# Patient Record
Sex: MALE | Race: BLACK OR AFRICAN AMERICAN | NOT HISPANIC OR LATINO
[De-identification: names, ages, dates, MRNs, and addresses within clinical notes are randomized per-mention and may not be internally consistent; named-entity substitution may affect disease eponyms.]

---

## 2018-05-24 ENCOUNTER — APPOINTMENT (OUTPATIENT)
Dept: PHYSICAL MEDICINE AND REHAB | Facility: CLINIC | Age: 83
End: 2018-05-24
Payer: MEDICARE

## 2018-05-24 VITALS
HEART RATE: 58 BPM | TEMPERATURE: 98.2 F | OXYGEN SATURATION: 99 % | DIASTOLIC BLOOD PRESSURE: 71 MMHG | SYSTOLIC BLOOD PRESSURE: 123 MMHG

## 2018-05-24 DIAGNOSIS — R26.9 UNSPECIFIED ABNORMALITIES OF GAIT AND MOBILITY: ICD-10-CM

## 2018-05-24 PROCEDURE — 99213 OFFICE O/P EST LOW 20 MIN: CPT

## 2018-05-24 RX ORDER — SELENIUM SULFIDE 25 MG/ML
2.5 LOTION TOPICAL
Qty: 120 | Refills: 0 | Status: ACTIVE | COMMUNITY
Start: 2018-04-16

## 2018-05-24 RX ORDER — BLOOD SUGAR DIAGNOSTIC
STRIP MISCELLANEOUS
Qty: 200 | Refills: 0 | Status: ACTIVE | COMMUNITY
Start: 2018-01-12

## 2018-05-24 RX ORDER — LANCETS
EACH MISCELLANEOUS
Qty: 100 | Refills: 0 | Status: ACTIVE | COMMUNITY
Start: 2018-04-06

## 2018-05-24 RX ORDER — PEN NEEDLE, DIABETIC 29 G X1/2"
31G X 5 MM NEEDLE, DISPOSABLE MISCELLANEOUS
Qty: 200 | Refills: 0 | Status: ACTIVE | COMMUNITY
Start: 2017-10-27

## 2018-05-24 RX ORDER — CLOBETASOL PROPIONATE 0.5 MG/G
0.05 GEL TOPICAL
Qty: 60 | Refills: 0 | Status: ACTIVE | COMMUNITY
Start: 2017-06-12

## 2018-05-24 RX ORDER — INSULIN ASPART 100 [IU]/ML
100 INJECTION, SOLUTION INTRAVENOUS; SUBCUTANEOUS
Qty: 15 | Refills: 0 | Status: ACTIVE | COMMUNITY
Start: 2017-10-27

## 2018-05-24 RX ORDER — INSULIN DETEMIR 100 [IU]/ML
100 INJECTION, SOLUTION SUBCUTANEOUS
Qty: 15 | Refills: 0 | Status: ACTIVE | COMMUNITY
Start: 2017-10-27

## 2018-06-24 ENCOUNTER — APPOINTMENT (OUTPATIENT)
Dept: MRI IMAGING | Facility: IMAGING CENTER | Age: 83
End: 2018-06-24

## 2018-08-24 ENCOUNTER — APPOINTMENT (OUTPATIENT)
Dept: PHYSICAL MEDICINE AND REHAB | Facility: CLINIC | Age: 83
End: 2018-08-24
Payer: MEDICARE

## 2018-08-24 VITALS — SYSTOLIC BLOOD PRESSURE: 138 MMHG | OXYGEN SATURATION: 99 % | HEART RATE: 63 BPM | DIASTOLIC BLOOD PRESSURE: 68 MMHG

## 2018-08-24 PROCEDURE — 99213 OFFICE O/P EST LOW 20 MIN: CPT

## 2018-09-05 ENCOUNTER — APPOINTMENT (OUTPATIENT)
Dept: PHYSICAL MEDICINE AND REHAB | Facility: CLINIC | Age: 83
End: 2018-09-05
Payer: MEDICARE

## 2018-09-05 VITALS — OXYGEN SATURATION: 96 % | DIASTOLIC BLOOD PRESSURE: 78 MMHG | SYSTOLIC BLOOD PRESSURE: 157 MMHG | HEART RATE: 83 BPM

## 2018-09-05 PROCEDURE — 99214 OFFICE O/P EST MOD 30 MIN: CPT

## 2018-10-02 ENCOUNTER — APPOINTMENT (OUTPATIENT)
Dept: PHYSICAL MEDICINE AND REHAB | Facility: CLINIC | Age: 83
End: 2018-10-02
Payer: MEDICARE

## 2018-10-02 ENCOUNTER — OUTPATIENT (OUTPATIENT)
Dept: OUTPATIENT SERVICES | Facility: HOSPITAL | Age: 83
LOS: 1 days | End: 2018-10-02
Payer: COMMERCIAL

## 2018-10-02 DIAGNOSIS — M54.16 RADICULOPATHY, LUMBAR REGION: ICD-10-CM

## 2018-10-02 PROCEDURE — 64483 NJX AA&/STRD TFRM EPI L/S 1: CPT | Mod: RT

## 2018-10-02 PROCEDURE — 64484 NJX AA&/STRD TFRM EPI L/S EA: CPT

## 2018-10-02 PROCEDURE — 64484 NJX AA&/STRD TFRM EPI L/S EA: CPT | Mod: RT

## 2018-10-02 PROCEDURE — 64483 NJX AA&/STRD TFRM EPI L/S 1: CPT

## 2018-10-02 PROCEDURE — 82962 GLUCOSE BLOOD TEST: CPT

## 2018-10-08 DIAGNOSIS — E11.65 TYPE 2 DIABETES MELLITUS WITH HYPERGLYCEMIA: ICD-10-CM

## 2018-10-08 DIAGNOSIS — M54.17 RADICULOPATHY, LUMBOSACRAL REGION: ICD-10-CM

## 2018-10-30 ENCOUNTER — APPOINTMENT (OUTPATIENT)
Dept: PHYSICAL MEDICINE AND REHAB | Facility: CLINIC | Age: 83
End: 2018-10-30
Payer: MEDICARE

## 2018-10-30 VITALS
OXYGEN SATURATION: 100 % | SYSTOLIC BLOOD PRESSURE: 129 MMHG | TEMPERATURE: 97.5 F | DIASTOLIC BLOOD PRESSURE: 69 MMHG | HEART RATE: 63 BPM

## 2018-10-30 PROCEDURE — 99214 OFFICE O/P EST MOD 30 MIN: CPT | Mod: GC

## 2018-11-06 ENCOUNTER — APPOINTMENT (OUTPATIENT)
Dept: PHYSICAL MEDICINE AND REHAB | Facility: CLINIC | Age: 83
End: 2018-11-06
Payer: MEDICARE

## 2018-11-06 ENCOUNTER — OUTPATIENT (OUTPATIENT)
Dept: OUTPATIENT SERVICES | Facility: HOSPITAL | Age: 83
LOS: 1 days | End: 2018-11-06
Payer: COMMERCIAL

## 2018-11-06 DIAGNOSIS — M54.16 RADICULOPATHY, LUMBAR REGION: ICD-10-CM

## 2018-11-06 PROCEDURE — 82962 GLUCOSE BLOOD TEST: CPT

## 2018-11-06 PROCEDURE — 64483 NJX AA&/STRD TFRM EPI L/S 1: CPT

## 2018-11-06 PROCEDURE — 64483 NJX AA&/STRD TFRM EPI L/S 1: CPT | Mod: RT

## 2018-11-06 PROCEDURE — 64484 NJX AA&/STRD TFRM EPI L/S EA: CPT | Mod: RT

## 2018-11-06 PROCEDURE — 64484 NJX AA&/STRD TFRM EPI L/S EA: CPT

## 2018-11-08 DIAGNOSIS — M54.17 RADICULOPATHY, LUMBOSACRAL REGION: ICD-10-CM

## 2018-11-08 DIAGNOSIS — E11.65 TYPE 2 DIABETES MELLITUS WITH HYPERGLYCEMIA: ICD-10-CM

## 2018-11-28 ENCOUNTER — APPOINTMENT (OUTPATIENT)
Dept: PHYSICAL MEDICINE AND REHAB | Facility: CLINIC | Age: 83
End: 2018-11-28
Payer: MEDICARE

## 2018-11-28 VITALS
TEMPERATURE: 97.6 F | HEART RATE: 62 BPM | OXYGEN SATURATION: 97 % | SYSTOLIC BLOOD PRESSURE: 135 MMHG | DIASTOLIC BLOOD PRESSURE: 75 MMHG

## 2018-11-28 DIAGNOSIS — M54.41 LUMBAGO WITH SCIATICA, RIGHT SIDE: ICD-10-CM

## 2018-11-28 PROCEDURE — 99214 OFFICE O/P EST MOD 30 MIN: CPT

## 2019-05-22 ENCOUNTER — APPOINTMENT (OUTPATIENT)
Dept: PHYSICAL MEDICINE AND REHAB | Facility: CLINIC | Age: 84
End: 2019-05-22
Payer: MEDICARE

## 2019-05-22 VITALS
OXYGEN SATURATION: 99 % | SYSTOLIC BLOOD PRESSURE: 134 MMHG | TEMPERATURE: 97.8 F | DIASTOLIC BLOOD PRESSURE: 70 MMHG | HEART RATE: 69 BPM

## 2019-05-22 DIAGNOSIS — Z80.9 FAMILY HISTORY OF MALIGNANT NEOPLASM, UNSPECIFIED: ICD-10-CM

## 2019-05-22 PROCEDURE — 99214 OFFICE O/P EST MOD 30 MIN: CPT

## 2019-05-24 PROBLEM — Z80.9 FAMILY HISTORY OF CANCER: Status: ACTIVE | Noted: 2019-05-24

## 2019-05-24 NOTE — ASSESSMENT
[FreeTextEntry1] : Mr. HERNÁNDEZ is a 83 year year old man here for follow up of right distal residual limb pain x 1 month.  Pain has been bothersome making it difficult to walk, stand, and sleep.  Topical analgesic (aspercreme) and massage helps. Possible neuroma, unlikely related to skin breakdown. Recommend him to off load the painful site, can trial gabapentin 600mg at bedtime, follow up with PCP to check Cr. Clearance. Continue topical NSAIDs. Right knee MRI to evaluate for neuroma. Follow up after MRI.

## 2019-05-24 NOTE — REVIEW OF SYSTEMS
[Muscle Pain] : muscle pain [Negative] : Heme/Lymph [Muscle Weakness] : no muscle weakness [FreeTextEntry9] : as per hpi

## 2019-05-24 NOTE — PHYSICAL EXAM
[FreeTextEntry1] : General: NAD, alert\par Psych: normal mood and affect\par HEENT: NC/AT, normal visual tracking\par Pulmonary: no resp distress, chest expansion appears symmetrical\par CV: extremities are warm and perfused\par Abd: non-distended\par Ext: bilateral BKA with prosthesis - skin intact with boggy tissue to distal right residual tender with deep palpation\par \par

## 2019-05-24 NOTE — HISTORY OF PRESENT ILLNESS
[FreeTextEntry1] : Mr. ALEXANDRA HERNÁNDEZ is a 83 year old male here for follow up of right residual limb pain. He denies phantom pain.  Right residual limb pain has been present for about 1 month at a 9/10 - described as throbbing, grabbing, numbing, and can be sharp.  Pain isn't prominent while in a seated position without right lower extremity prosthetic on.  Pain with standing and walking in right prosthetic and during sleeping without prosthesis on.  He states he would wake up screaming from pain at timest.  Better with topical analgesic and massage.  States he take gabapentin 300mg at bedtime only without any help with pain.\par

## 2019-06-19 ENCOUNTER — APPOINTMENT (OUTPATIENT)
Dept: PHYSICAL MEDICINE AND REHAB | Facility: CLINIC | Age: 84
End: 2019-06-19
Payer: MEDICARE

## 2019-06-19 VITALS
HEART RATE: 65 BPM | TEMPERATURE: 98 F | DIASTOLIC BLOOD PRESSURE: 78 MMHG | OXYGEN SATURATION: 97 % | SYSTOLIC BLOOD PRESSURE: 153 MMHG

## 2019-06-19 PROCEDURE — 99214 OFFICE O/P EST MOD 30 MIN: CPT | Mod: 25

## 2019-06-19 PROCEDURE — 64450 NJX AA&/STRD OTHER PN/BRANCH: CPT | Mod: RT

## 2019-06-21 NOTE — REVIEW OF SYSTEMS
[Muscle Pain] : muscle pain [Negative] : Heme/Lymph [FreeTextEntry9] : as per hpi [Muscle Weakness] : no muscle weakness

## 2019-06-21 NOTE — ASSESSMENT
[FreeTextEntry1] : Mr. HERNÁNDEZ is a 83 year year old man here for follow up of right distal residual limb pain may be secondary to neuroma, unlikely related to skin breakdown/changes. Have increased gabapentin, toppical analgesic agents, and off loading right limb - all without improvement.  MRI reviewed with patient and wife.  Interventional block performed in office under ultrasound guidance (see procedure note).  He tolerated procedure well with improvement of pain. If block duration is limited, consider RFA. Follow up in 1 month.

## 2019-06-21 NOTE — HISTORY OF PRESENT ILLNESS
[FreeTextEntry1] : Mr. ALEXANDRA HERNÁNDEZ is a 83 year old male here for follow up of right residual limb pain. Pain is intermittent, like a "nagging toothache." Reports sleep disturbance from pain.  Pain is spontaneous and primarily to distal right limb.  Takes gabapentin 300mg in AM and 600mg HS with no effect.  Uses topical pain blocker and voltaren gel which only provides 20 minutes of pain relief. Pain has been present for about 3 month at a 9/10 - described as throbbing, grabbing, numbing, and can be sharp. Pain isn't prominent while in a seated position without right lower extremity prosthetic on. Pain with standing and walking in right prosthetic and during sleeping without prosthesis on. He states he would wake up screaming from pain at timest. \par \par

## 2019-06-21 NOTE — PROCEDURE
[de-identified] : Reason for procedure: neuroma pain\par \par Procedure: \par 1. sciatic nerve block at the popliteal fossa\par 2. ultrasound guidance\par \par Physician: Dr. Stoner\par Medication injected: 40mg Kenalog, 2cc Lidocaine 1%\par Sedation medications: None\par Estimated blood loss: None\par Complications: None\par \par Technique: The procedure was explained in detail including associated risk and informed consent was obtained. The patient was placed in prone position. Ultrasound evaluation demonstrated the a mixed hyper/hypo echoic structure with "honeycomb" appearance consistent with nerve, overlying muscles and vasculature. There was reproduction of pain on palpation. The area was prepped in normal sterile fashion with Chloroprep. A 25 gauge 1.5 inch needle was advanced toward and just superficial the hyper/hypoechoic structure with us guidance. After negative aspiration of heme, the above medications were injected with ultrasound guidance. The needle was then directed deep to the structure. Hydrodissection was performed with the above medications. Needle was then removed, bandaid placed over injection site. There was no complications, the patient was provided with post injection instructions and noted improvement in pain after injection.

## 2019-06-21 NOTE — PHYSICAL EXAM
[FreeTextEntry1] : General: NAD, alert\par Psych: normal mood and affect\par HEENT: NC/AT, normal visual tracking\par Pulmonary: no resp distress, chest expansion appears symmetrical\par CV: extremities are warm and perfused\par Abd: non-distended\par Ext: bilateral BKA with prosthesis - skin intact with hyperpigmentation to anterior aspect below knee - nonpainful to touch.  Boggy tissue to distal right residual limb tender with deep palpation.  Most tender to mid popliteal fossa\par \par

## 2019-06-21 NOTE — DATA REVIEWED
[MRI] : MRI [FreeTextEntry1] : Right knee MRI\par meniscal tear\par insertional quad tendinosis, patellar tendon degeneration, anterior subcutaneous edema, patellar articular cartilage loss\par  gastrocnemius muscle atrophy and hyperintensity

## 2019-07-17 ENCOUNTER — APPOINTMENT (OUTPATIENT)
Dept: PHYSICAL MEDICINE AND REHAB | Facility: CLINIC | Age: 84
End: 2019-07-17
Payer: MEDICARE

## 2019-07-17 VITALS
OXYGEN SATURATION: 99 % | HEART RATE: 61 BPM | TEMPERATURE: 97.6 F | DIASTOLIC BLOOD PRESSURE: 58 MMHG | SYSTOLIC BLOOD PRESSURE: 132 MMHG

## 2019-07-17 PROCEDURE — 99214 OFFICE O/P EST MOD 30 MIN: CPT | Mod: 25

## 2019-07-17 PROCEDURE — 76942 ECHO GUIDE FOR BIOPSY: CPT | Mod: RT

## 2019-07-17 PROCEDURE — 64445 NJX AA&/STRD SCIATIC NRV IMG: CPT

## 2019-07-17 NOTE — ASSESSMENT
[FreeTextEntry1] : Mr. HERNÁNDEZ is a 84 year year old man here for follow up of right distal residual limb pain may be secondary to neuroma. He has tried increased gabapentin, toppical analgesic agents, and off loading right limb - all without improvement.  MRI reviewed with patient and wife.  Interventional block performed in office under ultrasound guidance (see procedure note) repeated today.  He tolerated procedure well with improvement of pain. If block duration is limited, consider RFA. He will contact me in 2 weeks to discuss further.

## 2019-07-17 NOTE — HISTORY OF PRESENT ILLNESS
[FreeTextEntry1] : Mr. ALEXANDRA HERNÁNDEZ is a 83 year old male here for follow up of right residual limb pain. He had sciatic block with improvement of his pain for about 2 weeks. Pain has returned. No complications from recent block. Pain is intermittent, like a "nagging toothache." Reports sleep disturbance from pain.  Pain is spontaneous and primarily to distal right limb.  Takes gabapentin 300mg in AM and 600mg HS with no effect.  Pain has been present for about 4 month at a 9/10 - described as throbbing, grabbing, numbing, and can be sharp. Pain isn't prominent while in a seated position without right lower extremity prosthetic on. Pain with standing and walking in right prosthetic and during sleeping without prosthesis on. He states he would wake up screaming from pain at timest. \par \par

## 2019-07-17 NOTE — REVIEW OF SYSTEMS
[Muscle Pain] : muscle pain [Muscle Weakness] : no muscle weakness [Negative] : Heme/Lymph [FreeTextEntry9] : as per hpi

## 2019-07-17 NOTE — PROCEDURE
[de-identified] : Reason for procedure: neuroma pain\par \par Procedure: \par 1. sciatic nerve block at the popliteal fossa\par 2. ultrasound guidance\par \par Physician: Dr. Stoner\par Medication injected: 40mg Kenalog, 2cc Lidocaine 1%\par Sedation medications: None\par Estimated blood loss: None\par Complications: None\par \par Technique: The procedure was explained in detail including associated risk and informed consent was obtained. The patient was placed in prone position. Ultrasound evaluation demonstrated the a mixed hyper/hypo echoic structure with "honeycomb" appearance consistent with nerve, overlying muscles and vasculature. There was reproduction of pain on palpation. The area was prepped in normal sterile fashion with Chloroprep. A 25 gauge 1.5 inch needle was advanced toward and just superficial the hyper/hypoechoic structure with us guidance. After negative aspiration of heme, the above medications were injected with ultrasound guidance. The needle was then directed deep to the structure. Hydrodissection was performed with the above medications. Needle was then removed, bandaid placed over injection site. There was no complications, the patient was provided with post injection instructions and noted improvement in pain after injection.

## 2019-07-17 NOTE — PHYSICAL EXAM
[FreeTextEntry1] : General: NAD, alert\par Psych: normal mood and affect\par HEENT: NC/AT, normal visual tracking\par Pulmonary: no resp distress, chest expansion appears symmetrical\par CV: extremities are warm and perfused\par Abd: non-distended\par Ext: bilateral BKA with prosthesis - skin intact with hyperpigmentation to anterior aspect below knee - nonpainful to touch.  Boggy tissue to distal right residual limb tender with deep palpation.  Most tender to mid popliteal fossa

## 2019-08-15 ENCOUNTER — RX RENEWAL (OUTPATIENT)
Age: 84
End: 2019-08-15

## 2019-08-19 ENCOUNTER — RX RENEWAL (OUTPATIENT)
Age: 84
End: 2019-08-19

## 2019-08-19 RX ORDER — DICLOFENAC SODIUM 10 MG/G
1 GEL TOPICAL
Qty: 100 | Refills: 2 | Status: ACTIVE | COMMUNITY
Start: 2019-05-22 | End: 1900-01-01

## 2019-08-20 ENCOUNTER — OUTPATIENT (OUTPATIENT)
Dept: OUTPATIENT SERVICES | Facility: HOSPITAL | Age: 84
LOS: 1 days | End: 2019-08-20
Payer: MEDICARE

## 2019-08-20 ENCOUNTER — APPOINTMENT (OUTPATIENT)
Dept: PHYSICAL MEDICINE AND REHAB | Facility: CLINIC | Age: 84
End: 2019-08-20

## 2019-08-20 DIAGNOSIS — E11.65 TYPE 2 DIABETES MELLITUS WITH HYPERGLYCEMIA: ICD-10-CM

## 2019-08-20 DIAGNOSIS — T87.30: ICD-10-CM

## 2019-08-20 DIAGNOSIS — M54.12 RADICULOPATHY, CERVICAL REGION: ICD-10-CM

## 2019-08-20 PROCEDURE — 64640 INJECTION TREATMENT OF NERVE: CPT

## 2019-08-20 PROCEDURE — 82962 GLUCOSE BLOOD TEST: CPT

## 2019-09-03 ENCOUNTER — APPOINTMENT (OUTPATIENT)
Dept: PHYSICAL MEDICINE AND REHAB | Facility: CLINIC | Age: 84
End: 2019-09-03
Payer: MEDICARE

## 2019-09-03 VITALS
HEART RATE: 68 BPM | TEMPERATURE: 97.5 F | OXYGEN SATURATION: 100 % | SYSTOLIC BLOOD PRESSURE: 136 MMHG | DIASTOLIC BLOOD PRESSURE: 73 MMHG

## 2019-09-03 PROCEDURE — 99214 OFFICE O/P EST MOD 30 MIN: CPT

## 2019-09-10 NOTE — PHYSICAL EXAM
[FreeTextEntry1] : General: NAD, alert\par Psych: normal mood and affect\par HEENT: NC/AT, normal visual tracking\par Pulmonary: no resp distress, chest expansion appears symmetrical\par CV: extremities are warm and perfused\par Abd: non-distended\par Ext: bilateral BKA with prosthesis - skin intact with hyperpigmentation to anterior aspect below knee - nonpainful to touch.  Most tender to mid popliteal fossa with radiation to distal limb.  Sensation decreased to light touch posteriorly compared to anterior skin.

## 2019-09-10 NOTE — REVIEW OF SYSTEMS
[Muscle Pain] : muscle pain [Negative] : Psychiatric [Muscle Weakness] : no muscle weakness [FreeTextEntry9] : as per hpi

## 2019-09-10 NOTE — ASSESSMENT
[FreeTextEntry1] : Mr. HERNÁNDEZ is a 84 year year old man here for follow up of right distal residual limb pain possibly secondary to neuroma (cystic structure noted on MRI). He has tried increased gabapentin, topical analgesic agents, and off loading right limb, and sciatic RFA - all with minimal improvement.  \par \par Plan: \par 1. Will submit for SPS sciatic nerve stimulation proximal to knee.  Procedure as well as risk and benefits were discussed with patient and wife. Follow up for procedure.\par 2. Instructed to increase Gabapentin, taking an extra 300mg tab qHS in addition to TID dosing along with Tylenol. Denies any sedative side effects.

## 2019-09-10 NOTE — HISTORY OF PRESENT ILLNESS
[FreeTextEntry1] : Mr. ALEXANDRA HERNÁNDEZ is a 83 year old male here for follow up of right residual limb pain.  He underwent RFA to R sciatic on 8/20 with pain relief only for 1 day.  He returns for followup for same pain that has not been relieved.  Pain is described as similar to prior. Trailed increased gabapentin to 300 TID and topical Voltaren.    Topical analgesic relief lasts about 1 hr. Pain intermittent, sharp, burning, located popliteal region with radiation down the residual limb.  Noted to happened at randoms times but exacerbated with walking with prosthetic.  Admitted to intentional 10 lb weight loss over the last 3 months trying to get better control of his diabetes.  States he noticed loosening of his prosthetic which he would at times add an extra sock.  Denies back pain. \par \par 7-17-19 Presented for R residual limb pain. He had sciatic block with improvement of his pain for about 2 weeks. Pain has returned. No complications from recent block. Pain is intermittent, like a "nagging toothache." Reports sleep disturbance from pain.  Pain is spontaneous and primarily to distal right limb.  Takes gabapentin 300mg in AM and 600mg HS with no effect.  Pain has been present for about 4 month at a 9/10 - described as throbbing, grabbing, numbing, and can be sharp. Pain isn't prominent while in a seated position without right lower extremity prosthetic on. Pain with standing and walking in right prosthetic and during sleeping without prosthesis on. He states he would wake up screaming from pain at timest. \par \par

## 2019-10-23 ENCOUNTER — APPOINTMENT (OUTPATIENT)
Dept: PHYSICAL MEDICINE AND REHAB | Facility: CLINIC | Age: 84
End: 2019-10-23
Payer: MEDICARE

## 2019-10-23 VITALS — OXYGEN SATURATION: 98 % | DIASTOLIC BLOOD PRESSURE: 66 MMHG | HEART RATE: 63 BPM | SYSTOLIC BLOOD PRESSURE: 119 MMHG

## 2019-10-23 DIAGNOSIS — T87.30: ICD-10-CM

## 2019-10-23 DIAGNOSIS — M79.2 NEURALGIA AND NEURITIS, UNSPECIFIED: ICD-10-CM

## 2019-10-23 PROCEDURE — 99214 OFFICE O/P EST MOD 30 MIN: CPT

## 2019-10-24 PROBLEM — T87.30: Status: ACTIVE | Noted: 2019-05-22

## 2019-10-24 NOTE — ASSESSMENT
[FreeTextEntry1] : Mr. HERNÁNDEZ is a 84 year year old man here for follow up of right low back pain.  Based on clinical evaluation, review of available imaging report, pain is likely lumbar spondylosis.  Was previously seen for right residual limb pain which has resolved with uptitrate of gabapentin and 3 weeks post ablation of neuroma.  For new onset, limited intervention has been initiated.  At this time, recommend simple stretched to do in bed before getting up.  Incorporate single exercises into daily routine.  May take tylenol PRN.  If pain worsens despite modalities - will proceed with right L4/5 L5/S1 MBB.  Procedure details, along with risk and benefits discussed.  Follow up in 3 months or sooner as needed

## 2019-10-24 NOTE — PHYSICAL EXAM
[FreeTextEntry1] : General: NAD, alert\par Psych: normal mood and affect\par HEENT: NC/AT, normal visual tracking\par Pulmonary: no resp distress, chest expansion appears symmetrical\par CV: extremities are warm and perfused\par Abd: non-distended\par Ext: bilateral BKA with prosthesis - skin intact with hyperpigmentation to anterior aspect below knee - nonpainful to touch\par \par Lumbar/Hip Spine:\par Inspection: normal muscle bulk without asymmetry\par Tenderness to palpation: mild TTP over lumbar paraspinal, NTTP over PSIS, greater trochanter, sacroiliac joints, piriformis\par ROM: within functional limits\par MMT: 5/5 bilateral lower extremities (HF, KE, KF, DF, PF, EHL)\par Reflexes: symmetric bilateral patella  (trace)\par Sensory: intact to light touch in all dermatomes of the bilateral lower extremities\par Provocative testing:\par Whitney's - soft positive\par Seated slump - negative\par

## 2019-10-24 NOTE — HISTORY OF PRESENT ILLNESS
[FreeTextEntry1] : Mr. ALEXANDRA HERNÁNDEZ is a 84 year old male here for follow up of back pain.  Today, he reports resolution of residual limb pain with increased dose of gabapentin (300/300/600) and s/p right ablation of neuroma.  He states relief from procedure started ~3 weeks after. He's currently complaining of right low back pain - worse in the AM upon getting out of bed.  Pain described as pounding, max 7-8/10 and minimal during the day with movement.  Denies radicular symptoms, paresthesia, B/B incontinence, or saddle anesthesia.  He uses a lumbar support brace with improvement.  He denies new medication regimen for back pain.

## 2019-10-24 NOTE — DATA REVIEWED
[MRI] : MRI [FreeTextEntry1] : Lumbar MRI (2018) - report from 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

## 2019-10-29 ENCOUNTER — APPOINTMENT (OUTPATIENT)
Dept: PHYSICAL MEDICINE AND REHAB | Facility: CLINIC | Age: 84
End: 2019-10-29

## 2019-11-20 ENCOUNTER — APPOINTMENT (OUTPATIENT)
Dept: PHYSICAL MEDICINE AND REHAB | Facility: CLINIC | Age: 84
End: 2019-11-20

## 2019-12-10 ENCOUNTER — RX RENEWAL (OUTPATIENT)
Age: 84
End: 2019-12-10

## 2019-12-11 ENCOUNTER — RX RENEWAL (OUTPATIENT)
Age: 84
End: 2019-12-11

## 2020-02-19 ENCOUNTER — APPOINTMENT (OUTPATIENT)
Dept: UROLOGY | Facility: CLINIC | Age: 85
End: 2020-02-19
Payer: MEDICARE

## 2020-02-19 VITALS
RESPIRATION RATE: 15 BRPM | WEIGHT: 230 LBS | HEART RATE: 57 BPM | DIASTOLIC BLOOD PRESSURE: 58 MMHG | HEIGHT: 71 IN | SYSTOLIC BLOOD PRESSURE: 143 MMHG | BODY MASS INDEX: 32.2 KG/M2

## 2020-02-19 PROCEDURE — 99204 OFFICE O/P NEW MOD 45 MIN: CPT

## 2020-02-19 RX ORDER — METOPROLOL SUCCINATE 25 MG/1
25 TABLET, EXTENDED RELEASE ORAL
Qty: 90 | Refills: 0 | Status: ACTIVE | COMMUNITY
Start: 2018-01-16

## 2020-02-19 RX ORDER — METFORMIN HYDROCHLORIDE 500 MG/1
500 TABLET, COATED ORAL
Qty: 180 | Refills: 0 | Status: ACTIVE | COMMUNITY
Start: 2018-01-12

## 2020-02-19 RX ORDER — CLOPIDOGREL BISULFATE 75 MG/1
75 TABLET, FILM COATED ORAL
Qty: 90 | Refills: 0 | Status: ACTIVE | COMMUNITY
Start: 2018-01-16

## 2020-02-19 RX ORDER — SILDENAFIL 100 MG/1
100 TABLET, FILM COATED ORAL
Qty: 20 | Refills: 0 | Status: ACTIVE | COMMUNITY
Start: 2020-02-19 | End: 1900-01-01

## 2020-02-19 RX ORDER — FENOFIBRATE 145 MG/1
145 TABLET, COATED ORAL
Qty: 90 | Refills: 0 | Status: ACTIVE | COMMUNITY
Start: 2018-01-16

## 2020-02-19 RX ORDER — LISINOPRIL 10 MG/1
10 TABLET ORAL
Qty: 90 | Refills: 0 | Status: ACTIVE | COMMUNITY
Start: 2018-01-16

## 2020-02-19 RX ORDER — LORATADINE 10 MG/1
10 TABLET ORAL
Qty: 90 | Refills: 0 | Status: ACTIVE | COMMUNITY
Start: 2017-03-02

## 2020-02-19 RX ORDER — SILDENAFIL 20 MG/1
20 TABLET ORAL
Qty: 20 | Refills: 1 | Status: ACTIVE | COMMUNITY
Start: 2020-02-19 | End: 1900-01-01

## 2020-02-19 NOTE — ASSESSMENT
[FreeTextEntry1] : Discussed etiology and management of erectile dysfunction. We discussed that etiology of ED may be multifactorial. \par We discussed treatment options including oral medication with [id number] (Cialis, Viagra, etc), Vacuum erectile device pump (DANIELA), intraurethral suppository (MUSE), cavernosal injection therapy (Caverject, Trimix, etc), and lastly, surgical options (penile prosthesis). \par risks and benefits of each reviewed\par He is interested in at this time\par \par Will try sildenafil side effects reviewed\par More common reviewed- redness or warmth in your face, neck, or chest; cold symptoms such as stuffy nose, sneezing, or sore throat; headache; memory problems; diarrhea, upset stomach; or muscle pain, back pain.\par Stop immediately and got to ER for changes in vision or sudden vision loss; ringing in your ears, or sudden hearing loss; chest pain or heavy feeling, pain spreading to the arm or shoulder, nausea, sweating, general ill feeling; irregular heartbeat; shortness of breath, swelling in your hands or feet; seizure (convulsions); feeling light-headed, fainting; or penis erection that is painful or lasts 4 hours or longer\par

## 2020-02-19 NOTE — HISTORY OF PRESENT ILLNESS
[FreeTextEntry1] : Cc ed\par Erectile Dysfunction\par Patient complains of erectile dysfunction. Onset of dysfunction was several years ago and was gradual in onset.  Patient states the nature of difficulty is both attaining and maintaining erection. Full erections occur never. . Libido is not affected. Risk factors for ED include antihypertensive medications, hyperlipid. dmPatient denies history of cardiovascular disease Patient's expectations as to sexual function good.  . Previous treatment of ED includes none\par

## 2020-02-19 NOTE — REVIEW OF SYSTEMS
[see HPI] : see HPI [Poor quality erections] : Poor quality erections [No erections] : no erections [Negative] : Heme/Lymph

## 2020-02-19 NOTE — PHYSICAL EXAM
[General Appearance - Well Nourished] : well nourished [General Appearance - Well Developed] : well developed [Normal Appearance] : normal appearance [Well Groomed] : well groomed [General Appearance - In No Acute Distress] : no acute distress [Respiration, Rhythm And Depth] : normal respiratory rhythm and effort [Edema] : no peripheral edema [Abdomen Soft] : soft [Exaggerated Use Of Accessory Muscles For Inspiration] : no accessory muscle use [Abdomen Tenderness] : non-tender [Urethral Meatus] : meatus normal [Costovertebral Angle Tenderness] : no ~M costovertebral angle tenderness [Scrotum] : the scrotum was normal [Urinary Bladder Findings] : the bladder was normal on palpation [Testes Mass (___cm)] : there were no testicular masses [Normal Station and Gait] : the gait and station were normal for the patient's age [] : no rash [No Focal Deficits] : no focal deficits [Affect] : the affect was normal [Oriented To Time, Place, And Person] : oriented to person, place, and time [Mood] : the mood was normal [Not Anxious] : not anxious [No Palpable Adenopathy] : no palpable adenopathy

## 2020-05-20 ENCOUNTER — APPOINTMENT (OUTPATIENT)
Dept: UROLOGY | Facility: CLINIC | Age: 85
End: 2020-05-20

## 2021-05-13 ENCOUNTER — APPOINTMENT (OUTPATIENT)
Dept: PHYSICAL MEDICINE AND REHAB | Facility: CLINIC | Age: 86
End: 2021-05-13
Payer: MEDICARE

## 2021-05-13 VITALS
OXYGEN SATURATION: 97 % | DIASTOLIC BLOOD PRESSURE: 76 MMHG | BODY MASS INDEX: 32.2 KG/M2 | WEIGHT: 230 LBS | HEART RATE: 68 BPM | HEIGHT: 71 IN | TEMPERATURE: 97.5 F | SYSTOLIC BLOOD PRESSURE: 172 MMHG

## 2021-05-13 PROCEDURE — 99072 ADDL SUPL MATRL&STAF TM PHE: CPT

## 2021-05-13 PROCEDURE — 99215 OFFICE O/P EST HI 40 MIN: CPT

## 2021-05-15 NOTE — HISTORY OF PRESENT ILLNESS
[FreeTextEntry1] : 86 yo M with PMH bilateral BKA, DM who presents with low back and left shoulder pain.\par \par Onset:  Left shoulder pain started several months ago.  Low back pain started several years and marked by intermittent flares.  Patient was seen previously by Dr. Stoner in 2019 and had underwent neuroma injections and lumbar JACK with significant improvement in his pain.    No inciting events, trauma, or falls.\par Location: left shoulder, lower lumbar spine\par Characteristics: sharp \par Aggravating factors: overhead activities, prolonged sitting, standing, walking\par Alleviating factors: rest\par Radiation: bilateral lower extremities\par Treatments: tylenol, NSAIDs, rest, physical therapy, HEP, gabapentin with some relief of his pain.  Patient underwent right L4-L5, L5-S1 TFESI with some relief of his pain\par Severity: 7-9/10\par \par Diagnostic studies:\par MRI lumbar spine w/o contrast 2018 showing mild multilevel degenerative changes worse at L4-L5 with disc herniation and bilateral facet arthrosis that leads to significant bilateral foraminal and central stenosis.\par No imaging of the shoulder\par No previous EMG/NCS\par \par Patient denies new weakness, numbness or paresthesia.  Patient denies bowel/bladder dysfunction, fevers, chills, weight loss, night pain, or night sweats.\par

## 2021-05-15 NOTE — ASSESSMENT
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  However, patient has new onset left shoulder pain.\par \par -PM&R notes reviewed \par -Imaging including previous MRI lumbar spine w/o contrast reviewed\par -Start mobic 15 mg PO qdaily x 30 days prn pain, recommend to take with food.  Denies CKD, CAD, or gastritis.  Recommend that if patient develops GI symptoms including abdominal pain, nausea, or vomiting to discontinue use of medication immediately.\par -XR left shoulder ordered, patient asked to return to clinic to review imaging.\par -RTC 4-6 weeks, if pain persists or worsen despite compliance with above, then will consider shoulder injections and MRI lumbar spine at follow up visit\par \par Dipak Mace MD\par Spine and Sports Medicine\par \par [facility] [employer]\par At [facility]\par \par

## 2021-05-15 NOTE — PHYSICAL EXAM
[FreeTextEntry1] : Gen: NAD\par Neck: non-tender to palpation, FAROM, neg spurling\par CV: no cyanosis\par Pulm: breathing well on room air\par Abd: soft\par Low back: range of motion limited by pain, tenderness to palpation lower lumbar paraspinals, +straight leg raise, neg FABERE, neg FAIR\par Left shoulder: range of motion limited in all planes secondary to pain, pos neer's, pos hawkin's, pos speed's, neg apprehension, neg cross arm, neg empty can \par Msk: \par 5/5 hip flexion B/L, 5/5 knee extension B/L, 5/5 knee flexion B/L\par 5/5 shoulder abduction B/L, 5/5 elbow flexion B/L, 5/5 elbow extension B/L, 5/5 wrist extension B/L, 5/5 hand  B/L\par Neuro: sensation intact to light touch in bilateral upper and lower extremities, reflexes 2+ brachioradialis, biceps, triceps bilaterally, reflexes 2+ patella, medial hamstring, negative darby\par

## 2021-06-14 ENCOUNTER — APPOINTMENT (OUTPATIENT)
Dept: MRI IMAGING | Facility: IMAGING CENTER | Age: 86
End: 2021-06-14
Payer: MEDICARE

## 2021-06-14 ENCOUNTER — RESULT REVIEW (OUTPATIENT)
Age: 86
End: 2021-06-14

## 2021-06-14 ENCOUNTER — APPOINTMENT (OUTPATIENT)
Dept: RADIOLOGY | Facility: IMAGING CENTER | Age: 86
End: 2021-06-14
Payer: MEDICARE

## 2021-06-14 ENCOUNTER — OUTPATIENT (OUTPATIENT)
Dept: OUTPATIENT SERVICES | Facility: HOSPITAL | Age: 86
LOS: 1 days | End: 2021-06-14
Payer: MEDICARE

## 2021-06-14 DIAGNOSIS — M25.512 PAIN IN LEFT SHOULDER: ICD-10-CM

## 2021-06-14 DIAGNOSIS — Z00.8 ENCOUNTER FOR OTHER GENERAL EXAMINATION: ICD-10-CM

## 2021-06-14 PROCEDURE — 72148 MRI LUMBAR SPINE W/O DYE: CPT | Mod: 26

## 2021-06-14 PROCEDURE — 73030 X-RAY EXAM OF SHOULDER: CPT | Mod: 26,LT

## 2021-06-14 PROCEDURE — 72148 MRI LUMBAR SPINE W/O DYE: CPT

## 2021-06-14 PROCEDURE — 73030 X-RAY EXAM OF SHOULDER: CPT

## 2021-07-08 ENCOUNTER — NON-APPOINTMENT (OUTPATIENT)
Age: 86
End: 2021-07-08

## 2021-07-22 ENCOUNTER — APPOINTMENT (OUTPATIENT)
Dept: PHYSICAL MEDICINE AND REHAB | Facility: CLINIC | Age: 86
End: 2021-07-22
Payer: MEDICARE

## 2021-07-22 PROCEDURE — 99214 OFFICE O/P EST MOD 30 MIN: CPT | Mod: 25

## 2021-07-22 PROCEDURE — 20611 DRAIN/INJ JOINT/BURSA W/US: CPT | Mod: LT

## 2021-07-22 PROCEDURE — 99072 ADDL SUPL MATRL&STAF TM PHE: CPT

## 2021-07-24 NOTE — PHYSICAL EXAM
[FreeTextEntry1] : Gen: NAD\par Neck: non-tender to palpation, FAROM, neg spurling\par CV: no cyanosis\par Pulm: breathing well on room air\par Abd: soft\par Low back: range of motion limited by pain, tenderness to palpation lower lumbar paraspinals and bilateral sciatic notch, +straight leg raise RLE, neg FABERE, neg FAIR\par Left shoulder: range of motion limited in all planes secondary to pain, pos neer's, pos hawkin's, pos speed's, neg drop arm, neg apprehension, neg cross arm, neg empty can\par Msk: \par 5/5 hip flexion B/L, 5/5 knee extension B/L, 5/5 knee flexion B/L\par 5/5 shoulder abduction B/L, 5/5 elbow flexion B/L, 5/5 elbow extension B/L, 5/5 wrist extension B/L, 5/5 hand  B/L\par Neuro: sensation intact to light touch in bilateral upper and lower extremities, reflexes 2+ brachioradialis, biceps, triceps bilaterally, reflexes 2+ patella, medial hamstring, negative darby\par

## 2021-07-24 NOTE — HISTORY OF PRESENT ILLNESS
[FreeTextEntry1] : 7/22/21\par 87 yo M who presents with low back and left shoulder pain.  XR left shoulder showing mild osteoarthritis.  MRI lumbar spine w/o contrast showing multilevel degenerative changes worse at L4-L5.  Patient reports continued right shoulder pain which is more pronounced with overhead activities and improves with rest.  Low back pain is in bilateral (R > L) sides of lumbar spine w/ radiation into bilateral buttocks.  Patient denies new weakness, numbness or paresthesia.  Denies bowel/bladder dysfunction, fevers, chills, weight loss, night pain, or night sweats.\par \par 5/13/21\par 86 yo M with PMH bilateral BKA, DM who presents with low back and left shoulder pain.\par \par Onset:  Left shoulder pain started several months ago.  Low back pain started several years and marked by intermittent flares.  Patient was seen previously by Dr. Stoner in 2019 and had underwent neuroma injections and lumbar JACK with significant improvement in his pain.    No inciting events, trauma, or falls.\par Location: left shoulder, lower lumbar spine\par Characteristics: sharp \par Aggravating factors: overhead activities, prolonged sitting, standing, walking\par Alleviating factors: rest\par Radiation: bilateral lower extremities\par Treatments: tylenol, NSAIDs, rest, physical therapy, HEP, gabapentin with some relief of his pain.  Patient underwent right L4-L5, L5-S1 TFESI with some relief of his pain\par Severity: 7-9/10\par \par Diagnostic studies:\par MRI lumbar spine w/o contrast 2018 showing mild multilevel degenerative changes worse at L4-L5 with disc herniation and bilateral facet arthrosis that leads to significant bilateral foraminal and central stenosis.\par No imaging of the shoulder\par No previous EMG/NCS\par \par Patient denies new weakness, numbness or paresthesia.  Patient denies bowel/bladder dysfunction, fevers, chills, weight loss, night pain, or night sweats.\par

## 2021-07-24 NOTE — ASSESSMENT
[FreeTextEntry1] : 86 yo M who presents with\par 1) left shoulder pain consistent with subacromial bursitis and rotator cuff impingement\par 2) low back pain with radiation into bilateral lower extremities consistent with lumbar radiculopathy secondary to lumbar stenosis and lumbar degenerative disc disease.\par \par -PM&R notes reviewed \par -MRI lumbar spine w/o contrast reviewed\par -XR left shoulder reviewed\par -US guided left subacromial bursa injection performed this AM with significant improvement in his pain.  Ice and tylenol prn pain.\par -I recommend that patient undergo bilateral L4-L5 TFESI.  Risks and benefits discussed with patient.  Will submit for insurance approval.  Possible complications including, but not limited to, epidural abscess, hematoma formation, hyperglycemia, DKA, permanent neurologic dysfunction, paralysis, and death, were reviewed.  Once insurance approval has been obtained, patient will be contacted to schedule injection at out-patient ambulatory center.  Patient reports being fully vaccinated against covid19.  I instructed patient to bring in a copy of proof of vaccination on the day of her injection.\par \par Dipak Mace MD\par Spine and Sports Medicine\par \par [employer]\par At [facility]\par \par

## 2021-07-24 NOTE — PROCEDURE
[de-identified] : Procedure: Ultrasound Guided Subacromial Bursa Steroid Injection:\par \par Shoulder- LEFT\par -Potential benefits and side effects of the procedure were explained to the patient and an opportunity for questions was provided.  In addition to typical procedure related side effects, specific possible side effects from the procedure were explained including injury to the nerves, vessels/branches, and skin depigmentation/ subcutaneous atrophy from corticosteroid.\par \par -Patient positioning: seated\par \par -Skin Preparation: the overlying skin was prepped with Chloro-prep swab sticks which was allowed to dry for at least 30 seconds.\par -Visualization of the subacromial bursa region anatomy was performed with a high frequency ultrasound probe sterilized with PDI wipe prior to application of sterile ultrasound gel.\par -Under direct ultrasound, the subacromial bursa was visualized in long and short axis.\par -The skin at the needle entry point was anesthetized with 1 mL of 0.5% lidocaine using a 27 gauge 1.5 inch needle.\par -Then a 25 gauge 2.5 inch needle was then advanced into the subacromial bursa using the following solution:\par -3.5 ml volume (3 mL of 0.5% lidocaine and 0.5 ml of kenalog 40)\par -Total volume injected in subacromial bursa: 3.5 ml\par -Needle position was monitored using both long-axis and short-axis visualization and adjusted as necessary and aspiration prior to injections were negative for blood return.\par -A Band-Aid was then placed over the needle entry site.\par \par Procedure summary:\par -Patient tolerance: Excellent\par -Miscellaneous technical comments: none\par \par Recommendations:\par -Ice the area for 20-30 minutes up to [id number] prn until being seen for follow-up\par -Limit use of the affected region.\par -continue with other aspects of treatment plan as described in prior office note.\par -Contact me with any questions/concerns.\par

## 2021-08-13 ENCOUNTER — OUTPATIENT (OUTPATIENT)
Dept: OUTPATIENT SERVICES | Facility: HOSPITAL | Age: 86
LOS: 1 days | End: 2021-08-13
Payer: MEDICARE

## 2021-08-13 ENCOUNTER — APPOINTMENT (OUTPATIENT)
Dept: PHYSICAL MEDICINE AND REHAB | Facility: CLINIC | Age: 86
End: 2021-08-13

## 2021-08-13 DIAGNOSIS — M54.16 RADICULOPATHY, LUMBAR REGION: ICD-10-CM

## 2021-08-13 PROCEDURE — 64483 NJX AA&/STRD TFRM EPI L/S 1: CPT

## 2021-08-13 PROCEDURE — 82962 GLUCOSE BLOOD TEST: CPT

## 2021-08-13 PROCEDURE — 64483 NJX AA&/STRD TFRM EPI L/S 1: CPT | Mod: 50

## 2021-08-19 DIAGNOSIS — E11.65 TYPE 2 DIABETES MELLITUS WITH HYPERGLYCEMIA: ICD-10-CM

## 2021-09-09 ENCOUNTER — APPOINTMENT (OUTPATIENT)
Dept: PHYSICAL MEDICINE AND REHAB | Facility: CLINIC | Age: 86
End: 2021-09-09
Payer: MEDICARE

## 2021-09-09 DIAGNOSIS — M54.16 RADICULOPATHY, LUMBAR REGION: ICD-10-CM

## 2021-09-09 DIAGNOSIS — M25.512 PAIN IN LEFT SHOULDER: ICD-10-CM

## 2021-09-09 DIAGNOSIS — M47.816 SPONDYLOSIS W/OUT MYELOPATHY OR RADICULOPATHY, LUMBAR REGION: ICD-10-CM

## 2021-09-09 PROCEDURE — 99214 OFFICE O/P EST MOD 30 MIN: CPT

## 2021-09-11 NOTE — PHYSICAL EXAM
[FreeTextEntry1] : Gen: NAD\par Neck: non-tender to palpation, FAROM, neg spurling\par CV: no cyanosis\par Pulm: breathing well on room air\par Abd: soft\par Low back: range of motion limited by pain, mild tenderness to palpation lower lumbar paraspinals,neg seated straight leg raise RLE, neg FABERE, neg FAIR\par Left shoulder: range of motion limited in all planes secondary to pain but improved from previous exam, pos neer's, neg hawkin's, neg speed's, neg drop arm, neg apprehension, neg cross arm, neg empty can\par Msk: \par 5/5 hip flexion B/L, 5/5 knee extension B/L, 5/5 knee flexion B/L\par 5/5 shoulder abduction B/L, 5/5 elbow flexion B/L, 5/5 elbow extension B/L, 5/5 wrist extension B/L, 5/5 hand  B/L\par Neuro: sensation intact to light touch in bilateral upper and lower extremities, reflexes 2+ brachioradialis, biceps, triceps bilaterally, reflexes 2+ patella, medial hamstring, negative darby\par

## 2021-09-11 NOTE — ASSESSMENT
[FreeTextEntry1] : 84 yo M who presents with\par 1) left shoulder pain consistent with subacromial bursitis and rotator cuff impingement\par 2) low back pain with radiation into bilateral lower extremities consistent with lumbar radiculopathy secondary to lumbar stenosis and lumbar degenerative disc disease.\par \par Almost near complete resolution of left shoulder and low back pain following left shoulder injection and lumbar JACK.  Will start PT/HEP and PO medication.\par \par -PM&R notes reviewed \par -MRI lumbar spine w/o contrast reviewed\par -XR left shoulder reviewed\par -Start mobic 15 mg PO qdaily x 30 days prn pain, recommend to take with food.  Denies CKD, CAD, or gastritis.  Recommend that if patient develops GI symptoms including abdominal pain, nausea, or vomiting to discontinue use of medication immediately.\par -Start PT/HEP, new referral provided\par -Red flag signs and symptoms were reviewed and patient instructed to seek immediate medical attention should these arise.\par -RTC 2 months\par \par Dipak Mace MD\par Spine and Sports Medicine\par \par [employer]\par At [facility]\par \par

## 2021-09-11 NOTE — HISTORY OF PRESENT ILLNESS
[FreeTextEntry1] : 9/9/21\par 87 yo M who presents for follow up with low back and left shoulder pain.  Patient reports that pain has almost completely resolved following bilateral L4-L5 TFESI on 8/13/21.  Patient is also reporting that his left shoulder pain continues to be well controlled after a corticosteroid injection into the left shoulder.  Patient is currently not taking PO medications or participating in PT/HEP.  Patient denies new weakness, numbness or paresthesia.  Denies bowel/bladder dysfunction, saddle anesthesia, fevers, chills, weight loss, night pain, or night sweats.\par \par 7/22/21\par 87 yo M who presents with low back and left shoulder pain.  XR left shoulder showing mild osteoarthritis.  MRI lumbar spine w/o contrast showing multilevel degenerative changes worse at L4-L5.  Patient reports continued right shoulder pain which is more pronounced with overhead activities and improves with rest.  Low back pain is in bilateral (R > L) sides of lumbar spine w/ radiation into bilateral buttocks.  Patient denies new weakness, numbness or paresthesia.  Denies bowel/bladder dysfunction, fevers, chills, weight loss, night pain, or night sweats.\par \par 5/13/21\par 86 yo M with PMH bilateral BKA, DM who presents with low back and left shoulder pain.\par \par Onset:  Left shoulder pain started several months ago.  Low back pain started several years and marked by intermittent flares.  Patient was seen previously by Dr. Stoner in 2019 and had underwent neuroma injections and lumbar JACK with significant improvement in his pain.    No inciting events, trauma, or falls.\par Location: left shoulder, lower lumbar spine\par Characteristics: sharp \par Aggravating factors: overhead activities, prolonged sitting, standing, walking\par Alleviating factors: rest\par Radiation: bilateral lower extremities\par Treatments: tylenol, NSAIDs, rest, physical therapy, HEP, gabapentin with some relief of his pain.  Patient underwent right L4-L5, L5-S1 TFESI with some relief of his pain\par Severity: 7-9/10\par \par Diagnostic studies:\par MRI lumbar spine w/o contrast 2018 showing mild multilevel degenerative changes worse at L4-L5 with disc herniation and bilateral facet arthrosis that leads to significant bilateral foraminal and central stenosis.\par No imaging of the shoulder\par No previous EMG/NCS\par \par Patient denies new weakness, numbness or paresthesia.  Patient denies bowel/bladder dysfunction, fevers, chills, weight loss, night pain, or night sweats.\par

## 2021-09-17 RX ORDER — MELOXICAM 15 MG/1
15 TABLET ORAL
Qty: 30 | Refills: 1 | Status: ACTIVE | COMMUNITY
Start: 2021-05-13 | End: 1900-01-01

## 2021-09-30 ENCOUNTER — APPOINTMENT (OUTPATIENT)
Dept: UROLOGY | Facility: CLINIC | Age: 86
End: 2021-09-30
Payer: MEDICARE

## 2021-09-30 PROCEDURE — 99213 OFFICE O/P EST LOW 20 MIN: CPT

## 2021-09-30 RX ORDER — TADALAFIL 20 MG/1
20 TABLET ORAL
Qty: 30 | Refills: 1 | Status: ACTIVE | COMMUNITY
Start: 2021-09-30 | End: 1900-01-01

## 2021-09-30 NOTE — HISTORY OF PRESENT ILLNESS
[FreeTextEntry1] : Cc ed\par Erectile Dysfunction\par Patient complains of erectile dysfunction. Onset of dysfunction was several years ago and was gradual in onset. Patient states the nature of difficulty is both attaining and maintaining erection. Full erections occur never. . Libido is not affected. Risk factors for ED include antihypertensive medications, hyperlipid. dmPatient denies history of cardiovascular disease Patient's expectations as to sexual function good. . Previous treatment of ED includes sildenafil

## 2021-11-18 ENCOUNTER — APPOINTMENT (OUTPATIENT)
Dept: UROLOGY | Facility: CLINIC | Age: 86
End: 2021-11-18
Payer: MEDICARE

## 2021-11-18 DIAGNOSIS — N52.9 MALE ERECTILE DYSFUNCTION, UNSPECIFIED: ICD-10-CM

## 2021-11-18 PROCEDURE — 99213 OFFICE O/P EST LOW 20 MIN: CPT

## 2021-11-29 PROBLEM — N52.9 ERECTILE DYSFUNCTION: Status: ACTIVE | Noted: 2020-02-19

## 2021-11-29 NOTE — HISTORY OF PRESENT ILLNESS
[FreeTextEntry1] : Cc ed\par Erectile Dysfunction\par Patient complains of erectile dysfunction. Onset of dysfunction was several years ago and was gradual in onset. Patient states the nature of difficulty is both attaining and maintaining erection. Full erections occur never. . Libido is not affected. Risk factors for ED include antihypertensive medications, hyperlipid. dmPatient denies history of cardiovascular disease Patient's expectations as to sexual function good. . Previous treatment of ED includes sildenafil \par better results with tadalfil

## 2022-06-02 ENCOUNTER — OUTPATIENT (OUTPATIENT)
Dept: OUTPATIENT SERVICES | Facility: HOSPITAL | Age: 87
LOS: 1 days | End: 2022-06-02
Payer: MEDICARE

## 2022-06-02 DIAGNOSIS — I73.9 PERIPHERAL VASCULAR DISEASE, UNSPECIFIED: ICD-10-CM

## 2022-06-02 PROCEDURE — 93018 CV STRESS TEST I&R ONLY: CPT

## 2022-06-02 PROCEDURE — 93017 CV STRESS TEST TRACING ONLY: CPT

## 2022-06-02 PROCEDURE — 78452 HT MUSCLE IMAGE SPECT MULT: CPT | Mod: 26,MC

## 2022-06-02 PROCEDURE — 93306 TTE W/DOPPLER COMPLETE: CPT | Mod: 26

## 2022-06-02 PROCEDURE — 93016 CV STRESS TEST SUPVJ ONLY: CPT

## 2022-06-02 PROCEDURE — A9502: CPT

## 2022-06-02 PROCEDURE — 93306 TTE W/DOPPLER COMPLETE: CPT

## 2022-06-02 PROCEDURE — 78452 HT MUSCLE IMAGE SPECT MULT: CPT | Mod: MC

## 2022-06-24 ENCOUNTER — OUTPATIENT (OUTPATIENT)
Dept: OUTPATIENT SERVICES | Facility: HOSPITAL | Age: 87
LOS: 1 days | Discharge: HOME | End: 2022-06-24

## 2022-06-24 VITALS — WEIGHT: 233.03 LBS | HEIGHT: 71 IN

## 2022-06-24 LAB
ALBUMIN SERPL ELPH-MCNC: 4 G/DL — SIGNIFICANT CHANGE UP (ref 3.3–5)
ALP SERPL-CCNC: 50 U/L — SIGNIFICANT CHANGE UP (ref 40–120)
ALT FLD-CCNC: 19 U/L — SIGNIFICANT CHANGE UP (ref 4–41)
ANION GAP SERPL CALC-SCNC: 10 MMOL/L — SIGNIFICANT CHANGE UP (ref 7–14)
AST SERPL-CCNC: 36 U/L — SIGNIFICANT CHANGE UP (ref 4–40)
BILIRUB SERPL-MCNC: 0.2 MG/DL — SIGNIFICANT CHANGE UP (ref 0.2–1.2)
BUN SERPL-MCNC: 21 MG/DL — SIGNIFICANT CHANGE UP (ref 7–23)
CALCIUM SERPL-MCNC: 9.7 MG/DL — SIGNIFICANT CHANGE UP (ref 8.4–10.5)
CHLORIDE SERPL-SCNC: 108 MMOL/L — HIGH (ref 98–107)
CO2 SERPL-SCNC: 23 MMOL/L — SIGNIFICANT CHANGE UP (ref 22–31)
CREAT SERPL-MCNC: 1.04 MG/DL — SIGNIFICANT CHANGE UP (ref 0.5–1.3)
EGFR: 71 ML/MIN/1.73M2 — SIGNIFICANT CHANGE UP
GLUCOSE SERPL-MCNC: 94 MG/DL — SIGNIFICANT CHANGE UP (ref 70–99)
HCT VFR BLD CALC: 35.4 % — LOW (ref 39–50)
HGB BLD-MCNC: 11.3 G/DL — LOW (ref 13–17)
MAGNESIUM SERPL-MCNC: 1.5 MG/DL — LOW (ref 1.6–2.6)
MCHC RBC-ENTMCNC: 28.8 PG — SIGNIFICANT CHANGE UP (ref 27–34)
MCHC RBC-ENTMCNC: 31.9 GM/DL — LOW (ref 32–36)
MCV RBC AUTO: 90.1 FL — SIGNIFICANT CHANGE UP (ref 80–100)
NRBC # BLD: 0 /100 WBCS — SIGNIFICANT CHANGE UP
NRBC # FLD: 0 K/UL — SIGNIFICANT CHANGE UP
PHOSPHATE SERPL-MCNC: 2.8 MG/DL — SIGNIFICANT CHANGE UP (ref 2.5–4.5)
PLATELET # BLD AUTO: 160 K/UL — SIGNIFICANT CHANGE UP (ref 150–400)
POTASSIUM SERPL-MCNC: 4.2 MMOL/L — SIGNIFICANT CHANGE UP (ref 3.5–5.3)
POTASSIUM SERPL-SCNC: 4.2 MMOL/L — SIGNIFICANT CHANGE UP (ref 3.5–5.3)
PROT SERPL-MCNC: 6.7 G/DL — SIGNIFICANT CHANGE UP (ref 6–8.3)
RBC # BLD: 3.93 M/UL — LOW (ref 4.2–5.8)
RBC # FLD: 14.5 % — SIGNIFICANT CHANGE UP (ref 10.3–14.5)
SODIUM SERPL-SCNC: 141 MMOL/L — SIGNIFICANT CHANGE UP (ref 135–145)
WBC # BLD: 5.02 K/UL — SIGNIFICANT CHANGE UP (ref 3.8–10.5)
WBC # FLD AUTO: 5.02 K/UL — SIGNIFICANT CHANGE UP (ref 3.8–10.5)

## 2022-06-24 PROCEDURE — 99152 MOD SED SAME PHYS/QHP 5/>YRS: CPT

## 2022-06-24 PROCEDURE — 93454 CORONARY ARTERY ANGIO S&I: CPT | Mod: 26

## 2022-06-24 PROCEDURE — 93010 ELECTROCARDIOGRAM REPORT: CPT

## 2022-06-24 RX ORDER — METFORMIN HYDROCHLORIDE 850 MG/1
1 TABLET ORAL
Qty: 0 | Refills: 0 | DISCHARGE

## 2022-06-24 RX ORDER — LISINOPRIL 2.5 MG/1
1 TABLET ORAL
Qty: 0 | Refills: 0 | DISCHARGE

## 2022-06-24 RX ORDER — CLOPIDOGREL BISULFATE 75 MG/1
1 TABLET, FILM COATED ORAL
Qty: 0 | Refills: 0 | DISCHARGE

## 2022-06-24 RX ORDER — MAGNESIUM SULFATE 500 MG/ML
1 VIAL (ML) INJECTION ONCE
Refills: 0 | Status: COMPLETED | OUTPATIENT
Start: 2022-06-24 | End: 2022-06-24

## 2022-06-24 RX ORDER — INSULIN DETEMIR 100/ML (3)
50 INSULIN PEN (ML) SUBCUTANEOUS
Qty: 0 | Refills: 0 | DISCHARGE

## 2022-06-24 RX ORDER — GABAPENTIN 400 MG/1
1 CAPSULE ORAL
Qty: 0 | Refills: 0 | DISCHARGE

## 2022-06-24 RX ORDER — FENOFIBRATE,MICRONIZED 130 MG
1 CAPSULE ORAL
Qty: 0 | Refills: 0 | DISCHARGE

## 2022-06-24 RX ORDER — LATANOPROST 0.05 MG/ML
1 SOLUTION/ DROPS OPHTHALMIC; TOPICAL
Qty: 0 | Refills: 0 | DISCHARGE

## 2022-06-24 RX ORDER — FLUTICASONE PROPIONATE 50 MCG
1 SPRAY, SUSPENSION NASAL
Qty: 0 | Refills: 0 | DISCHARGE

## 2022-06-24 RX ORDER — AMLODIPINE BESYLATE 2.5 MG/1
1 TABLET ORAL
Qty: 0 | Refills: 0 | DISCHARGE

## 2022-06-24 RX ORDER — INSULIN ASPART 100 [IU]/ML
15 INJECTION, SOLUTION SUBCUTANEOUS
Qty: 0 | Refills: 0 | DISCHARGE

## 2022-06-24 RX ORDER — SODIUM CHLORIDE 9 MG/ML
3 INJECTION INTRAMUSCULAR; INTRAVENOUS; SUBCUTANEOUS EVERY 8 HOURS
Refills: 0 | Status: DISCONTINUED | OUTPATIENT
Start: 2022-06-24 | End: 2022-07-08

## 2022-06-24 RX ORDER — LORATADINE 10 MG/1
1 TABLET ORAL
Qty: 0 | Refills: 0 | DISCHARGE

## 2022-06-24 RX ORDER — METOPROLOL TARTRATE 50 MG
1 TABLET ORAL
Qty: 0 | Refills: 0 | DISCHARGE

## 2022-06-24 RX ADMIN — Medication 100 GRAM(S): at 09:43

## 2022-06-24 NOTE — H&P CARDIOLOGY - NSICDXPASTSURGICALHX_GEN_ALL_CORE_FT
PAST SURGICAL HISTORY:  S/P BKA (below knee amputation) bilateral     S/P coronary artery stent placement

## 2022-06-24 NOTE — H&P CARDIOLOGY - NSICDXPASTMEDICALHX_GEN_ALL_CORE_FT
PAST MEDICAL HISTORY:  CAD (coronary artery disease) s/p stent placement    DM (diabetes mellitus)     HLD (hyperlipidemia)     HTN (hypertension)     PVD (peripheral vascular disease) s/p bilateral BKA

## 2022-06-24 NOTE — H&P CARDIOLOGY - HISTORY OF PRESENT ILLNESS
85 y/o male with a PMHx of CAD s/p EVELINE to mid circumflex (other disease as stated below), PVD s/p bilateral BKA, HTN, HLD and DM presents for elective cardiac catheterization. Pt reports being in his usual state of health with no complaints. Pt is relatively independent with the use of his electric motor cart and is able to cook/clean for himself. Pt recently saw his cardiologist, Dr. Lam, for routine cardiac follow up and was noted to have an abnormal EKG revealing "TWI consistent with strain pattern." Pt was subsequently referred for echocardiogram and nuclear stress test. His echocardiogram revealed normal LV function without evidence of valvular dysfunction. His nuclear stress test revealed a medium sized, predominantly reversible, mild to moderate intensity defect in the mid anterolateral wall, consistent with ischemia and a medium sized, predominantly fixed, mild to moderate intensity defect in the mid to basal inferior wall, consistent with attenuation artifact. Pt denies fever, chills, recent travel, headache, dizziness, visual deficits, chest pain, shortness of breath, orthopnea, palpitations, abdominal pain, N/V/D/C, hematochezia, melena, dysuria, hematuria, LOC, syncope, peripheral edema. In light of patients cardiac risk factors and abnormal noninvasive test findings, there is high suspicion for CAD. Patient is now referred to [facility] for a cardiac catheterization with possible PTCA/stent.    January 2013 - Cardiac cath:  LM: Normal.  Mid LAD: There was a diffuse 50% stenosis.  Distal LAD: There was a diffuse 40% stenosis.  D1: Angiography showed minor luminal irregularities with no flow limiting lesions.  D2: There was a diffuse 70% stenosis. Small vessel, too small for stenting or CABG.  Circumflex: Angiography showed minor luminal irregularities with no flow limiting lesions.  Mid circumflex: There was a diffuse 0% stenosis at the site of a prior stent.  Distal circumflex: The distal vessel was supplied by moderate collaterals from the RCA. There was a 100% stenosis.  RCA: Angiography showed minor luminal irregularities with no flow limiting lesions.  RPDA: There was a diffuse 50% stenosis.  RPLS: There was a 100% stenosis.  AORTA: The root exhibited normal size.  COMPLICATIONS: There were no complications.  DIAGNOSTIC RECOMMENDATIONS: He has diffuse atherosclerosis consistent with his risk factors and previous stenting of the circumflex. There are no lesions that require PCI or CABG, and his major epicardial vessels are  acceptable at present. The posterobasal and inferolateral ischemia probably reflect the closed RPL and distal CX, and are not amenable to PCI.      Cardiologist: Dr. Gerry PACHECO PCR negative 6/21/2022

## 2022-08-18 PROBLEM — E11.9 TYPE 2 DIABETES MELLITUS WITHOUT COMPLICATIONS: Chronic | Status: ACTIVE | Noted: 2022-06-24

## 2022-08-18 PROBLEM — I25.10 ATHEROSCLEROTIC HEART DISEASE OF NATIVE CORONARY ARTERY WITHOUT ANGINA PECTORIS: Chronic | Status: ACTIVE | Noted: 2022-06-24

## 2022-08-18 PROBLEM — E78.5 HYPERLIPIDEMIA, UNSPECIFIED: Chronic | Status: ACTIVE | Noted: 2022-06-24

## 2022-08-18 PROBLEM — I10 ESSENTIAL (PRIMARY) HYPERTENSION: Chronic | Status: ACTIVE | Noted: 2022-06-24

## 2022-09-23 ENCOUNTER — APPOINTMENT (OUTPATIENT)
Dept: GASTROENTEROLOGY | Facility: CLINIC | Age: 87
End: 2022-09-23

## 2022-09-23 VITALS
OXYGEN SATURATION: 98 % | WEIGHT: 230 LBS | SYSTOLIC BLOOD PRESSURE: 160 MMHG | BODY MASS INDEX: 32.2 KG/M2 | DIASTOLIC BLOOD PRESSURE: 80 MMHG | HEIGHT: 71 IN | HEART RATE: 70 BPM

## 2022-09-23 DIAGNOSIS — Z86.39 PERSONAL HISTORY OF OTHER ENDOCRINE, NUTRITIONAL AND METABOLIC DISEASE: ICD-10-CM

## 2022-09-23 DIAGNOSIS — I25.10 ATHEROSCLEROTIC HEART DISEASE OF NATIVE CORONARY ARTERY W/OUT ANGINA PECTORIS: ICD-10-CM

## 2022-09-23 DIAGNOSIS — Z86.79 PERSONAL HISTORY OF OTHER DISEASES OF THE CIRCULATORY SYSTEM: ICD-10-CM

## 2022-09-23 DIAGNOSIS — D50.8 OTHER IRON DEFICIENCY ANEMIAS: ICD-10-CM

## 2022-09-23 PROCEDURE — 99203 OFFICE O/P NEW LOW 30 MIN: CPT

## 2022-09-23 NOTE — ASSESSMENT
[FreeTextEntry1] : Pt. is a 86y/o M with PMH of PAD s/p B/l BKA, DM( on insulin levemir 50 units QD, novolog 15 units BID), HLD, CAD (last was at [facility] last year for a cath which led to no stents being placed) who is here for evaluation of anemia. He reports he was told he has iron deficiency anemia and needs evaluation. Given his history of CAD and smoking, he needs cardiology and pulmonary clearance. I will refer him to a pulmonary for evaluation.\par \par We will reach out to Dr. Bradshaw (his cardiologist) for cardiac clearance.\par \par We will repeat bloodwork (cbc, cmp , INR) and compare to prior results( we will obtain from his PCP)\par \par \par Reagan Lund MD\par [facility]

## 2022-09-23 NOTE — REVIEW OF SYSTEMS
[Fever] : no fever [Chills] : no chills [Eye Pain] : no eye pain [Loss Of Hearing] : no hearing loss [Heart Rate Is Slow] : the heart rate was not slow [Heart Rate Is Fast] : the heart rate was not fast [Shortness Of Breath] : no shortness of breath [Abdominal Pain] : no abdominal pain [Vomiting] : no vomiting [Genital Lesion] : no genital lesions [Pelvic Pain] : no pelvic pain [Joint Swelling] : no joint swelling [Skin Lesions] : no skin lesions [Confused] : no confusion [Convulsions] : no convulsions [Suicidal] : not suicidal [Proptosis] : no proptosis [Easy Bleeding] : no tendency for easy bleeding

## 2022-09-23 NOTE — PHYSICAL EXAM
[Well Developed] : well developed [Well Nourished] : well nourished [No Clubbing, Cyanosis] : no clubbing or cyanosis of the fingernails [Normal Color / Pigmentation] : normal skin color and pigmentation [Cranial Nerves Intact] : cranial nerves 2-12 were intact [Motor Exam] : the motor exam was normal [Normal] : oriented to person, place, and time [de-identified] : has below knee b/l amputation, no edema

## 2022-09-23 NOTE — HISTORY OF PRESENT ILLNESS
[FreeTextEntry1] : Pt. is a 88y/o M with PMH of PAD s/p B/l BKA, DM( on insulin levemir 50 units QD, novolog 15 units BID), HLD, CAD (last was at [facility] last year for a cath which led to no stents being placed) who is here for evaluation of anemia. Pt. denies seeing blood in his stools, abdominal pain, lightheadedness or shortness of breath. He is wheelchair bound due to being a double amputee due to peripheral artery disease. He is a retired cook.\par \par He denies drinking ETOH, denies smoking, he did both these activities for atleast 4-5 decades. He was a heavy smoker drinker and smoker.\par \par He last had blood work done within past 3 months.\par \par He has never had an EGD or colonoscopy before.\par \par Outpatient Cardiologist: Dr. Gerry Lam\par PCP: Dr. Sharon Franco.\par \par

## 2022-10-21 LAB
ALBUMIN SERPL ELPH-MCNC: 4.2 G/DL
ALP BLD-CCNC: 42 U/L
ALT SERPL-CCNC: 14 U/L
ANION GAP SERPL CALC-SCNC: 12 MMOL/L
AST SERPL-CCNC: 30 U/L
BASOPHILS # BLD AUTO: 0.03 K/UL
BASOPHILS NFR BLD AUTO: 0.6 %
BILIRUB SERPL-MCNC: 0.2 MG/DL
BUN SERPL-MCNC: 20 MG/DL
CALCIUM SERPL-MCNC: 9.8 MG/DL
CHLORIDE SERPL-SCNC: 108 MMOL/L
CO2 SERPL-SCNC: 23 MMOL/L
CREAT SERPL-MCNC: 0.91 MG/DL
EGFR: 83 ML/MIN/1.73M2
EOSINOPHIL # BLD AUTO: 0.12 K/UL
EOSINOPHIL NFR BLD AUTO: 2.4 %
GLUCOSE SERPL-MCNC: 82 MG/DL
HCT VFR BLD CALC: 33.3 %
HGB BLD-MCNC: 10.7 G/DL
IMM GRANULOCYTES NFR BLD AUTO: 0.2 %
INR PPP: 1.14 RATIO
LYMPHOCYTES # BLD AUTO: 1.72 K/UL
LYMPHOCYTES NFR BLD AUTO: 34.3 %
MAN DIFF?: NORMAL
MCHC RBC-ENTMCNC: 28.6 PG
MCHC RBC-ENTMCNC: 32.1 GM/DL
MCV RBC AUTO: 89 FL
MONOCYTES # BLD AUTO: 0.46 K/UL
MONOCYTES NFR BLD AUTO: 9.2 %
NEUTROPHILS # BLD AUTO: 2.67 K/UL
NEUTROPHILS NFR BLD AUTO: 53.3 %
PLATELET # BLD AUTO: 158 K/UL
POTASSIUM SERPL-SCNC: 4.2 MMOL/L
PROT SERPL-MCNC: 6.6 G/DL
PT BLD: 13.3 SEC
RBC # BLD: 3.74 M/UL
RBC # FLD: 14.6 %
SODIUM SERPL-SCNC: 143 MMOL/L
WBC # FLD AUTO: 5.01 K/UL